# Patient Record
Sex: FEMALE | Race: WHITE | NOT HISPANIC OR LATINO | Employment: FULL TIME | URBAN - METROPOLITAN AREA
[De-identification: names, ages, dates, MRNs, and addresses within clinical notes are randomized per-mention and may not be internally consistent; named-entity substitution may affect disease eponyms.]

---

## 2022-09-22 ENCOUNTER — HOSPITAL ENCOUNTER (OUTPATIENT)
Dept: TELEMEDICINE | Facility: OTHER | Age: 27
Discharge: HOME | End: 2022-09-22

## 2022-09-22 PROCEDURE — G0425 INPT/ED TELECONSULT30: HCPCS | Mod: 95,,, | Performed by: PSYCHIATRY & NEUROLOGY

## 2022-09-22 PROCEDURE — G0425 PR INPT TELEHEALTH CONSULT 30M: ICD-10-PCS | Mod: 95,,, | Performed by: PSYCHIATRY & NEUROLOGY

## 2022-09-22 NOTE — CONSULTS
"[facility]  Psychiatry  Telepsychiatry Consult Note    Please see previous notes:    Patient agreeable to consultation via telepsychiatry.    Tele-Consultation from Psychiatry started: 9/22/2022 at 12:01 PM  The chief complaint leading to psychiatric consultation is: depression, anxiety  This consultation was requested by Dr Granados, the OB-GYN Emergency Department attending physician.  The location of the consulting psychiatrist is [place].  The patient location is [facility] TRANSFER [place]   The patient arrived at the ED at: 9/22    Also present with the patient at the time of the consultation: mother with pt's permission    Patient Identification:   Maura Daniel is a 27 y.o. female.    Patient information was obtained from patient and parent.  Patient presented voluntarily to the Emergency Department by private vehicle.    Consults  Consult Start Time: 09/22/2022 12:01 CDT  Consult End Time: 09/22/2022 13:01 CDT      Subjective:     History of Present Illness:  Pt is a 26 y/o female 15 days post partum vaginal delivery and past psychiatric h/o depression, anxiety who presents to ED for weakness. Per Dr Granados, pt reported "difficulty with latching," saw lactation consultant, reported when baby has difficulty latching she gets frustrated and feels urge to hit baby, no h/o harming baby, no intent, no psychosis. Lives between mom and significant other who she has oldest child. No drugs or alcohol. Asked to consult on tx and f/u for depression and anxiety. On interview, pt with mother present, says "when baby wouldn't latch my anxiety heats up" get frustrated with baby, would never harm him, has never harmed her children before. Says overall feels "pretty happy" with her baby, just "anxious when I have to feed on demand" not currently supplementing. Asked what she feels issue is, says she and lactation consultant "figured it out." Feeding q3 hrs, sleeps in between feeds, " "says no help during the day; on maternity leave from work, plans to pump when she returns to work in 6 wks, mom will watch baby. Reports poor appetite, disrupted sleep, low energy. Asked about depression sxs says she has "Crying spells" and feels "overwhelmed," says this happened before pregnancy as well, asked if pregnancy was planned or unplanned says "a little of both." Older child is 5 and relatively self sufficient. Amenable to therapy and trial of SSRI, will f/u OB-GYN in interim. Mother asks about timing of improvement in sxs with medications, no safety concerns at this time.     Psychiatric History:   Previous Psychiatric Hospitalizations: Yes 2013 [facility] 1 wk  Previous Medication Trials: Yes unsure names, made me more anxious  Previous Suicide Attempts: 2013 OD sleeping pills OTC  History of Violence: no  History of Depression: yes  History of Purnima: no  History of Auditory/Visual Hallucination no  History of Delusions: no  Outpatient psychiatrist (current & past): No    Substance Abuse History:  Tobacco:No  Alcohol: No  Illicit Substances:No  Detox/Rehab: No    Legal History: Past charges/incarcerations: No     Family Psychiatric History: mom-post partum depr      Social History:  Developmental/Childhood:Achieved all developmental milestones timely  *Education:some college  Employment Status/Finances:Employed -  at law office  Relationship Status/Sexual Orientation: in a relationship  Children: 2  Housing Status: Home    history:  NO  Access to gun: YES: "but I don't touch em"     Zoroastrian: "used to be"  Recreational activities:running    Psychiatric Mental Status Exam:  Arousal: alert  Sensorium/Orientation: oriented to person, place, situation, day of week, month of year, year  Behavior/Cooperation: normal, cooperative   Speech: normal tone, normal rate, normal pitch, normal volume  Language: grossly intact  Mood: " stressed "   Affect: anxious  Thought Process: normal and " logical  Thought Content:   Auditory hallucinations: NO  Visual hallucinations: NO  Paranoia: NO  Delusions:  NO  Suicidal ideation: NO  Homicidal ideation: NO  Attention/Concentration:  completed serial 7s adequately  Memory:    Recent:  Intact   Remote: Intact   3/3 immediate, 3/3 at 5 min  Fund of Knowledge: Vocabulary appropriate    Abstract reasoning: similarities were abstract  Insight: has awareness of illness  Judgment: behavior is adequate to circumstances      Past Medical History: No past medical history on file.   Laboratory Data: Labs Reviewed - No data to display    Neurological History:  Seizures: No  Head trauma: Yes    Allergies:   Review of patient's allergies indicates:  Not on File    Medications in ER: Medications - No data to display    Medications at home: advil, PNV, iron, stool  Per :  2022 Hydrocodone-Acetamin 5-325 Mg   10.00 2 Ba Brandi Cassidy ([id number]) [place]       Assessment - Diagnosis - Goals:     IMPRESSION:   Unspecified anxiety d/o  Unspecified mood d/o    Depression and anxiety issues predating pregnancy, exacerbated in setting of  period, exclusively breast feeding, sleep deprivation, little support. Advised medication and lifestyle adjustments in addition to close follow up as outlined below.    RECOMMENDATIONS:     DISPOSITION: Once medically cleared;   Pt may be discharged home with next of kin with outpt psychiatric follow up. Resources provided ([facility]) & they were instructed to f/u within 1-2 weeks. Dr Granados arranged for OB/GYN f/u in 1 week for additional support. Discussed safety concerns and precautions. Also informed pt to return to ED for any worsening of psychiatric symptoms or any SI/HI/AVH.    PSYCHIATRIC MEDICATIONS  After discussing the risks, benefits, alternatives vs no treatment, patient is agreeable and desiring a trial of Zoloft 25 mg daily    LEGAL  Pt currently does not meet PEC criteria nor benefit from from involuntary inpatient psychiatric  admission.      OTHER  Recommended psychotherapy/CBT  Discussed importance of consolidated sleep, support system; asked pt to consider formula supplementation/breast feeding cessation/additional lactation support to reduce mental health burden and discus with other healthcare providers  Please have CM/SW assist patient with mental health / addiction f/u resources for s/p discharge from this facility   Patient was instructed to call 911 and return to the nearest ED if they begin feeling suicidal, homicidal, or gravely disabled due to a mental illness      Total time including chart review, time with patient, obtaining collateral info[if necessary/possible]: 60        More than 50% of the time was spent counseling/coordinating care    Consulting clinician was informed of the encounter and consult note.    Consultation ended: 9/22/2022 at 1:01 pm    Ning Carrillo MD   Psychiatry  [facility]